# Patient Record
Sex: FEMALE | Race: BLACK OR AFRICAN AMERICAN | NOT HISPANIC OR LATINO | Employment: UNEMPLOYED | URBAN - METROPOLITAN AREA
[De-identification: names, ages, dates, MRNs, and addresses within clinical notes are randomized per-mention and may not be internally consistent; named-entity substitution may affect disease eponyms.]

---

## 2018-06-12 ENCOUNTER — HOSPITAL ENCOUNTER (EMERGENCY)
Facility: OTHER | Age: 48
Discharge: HOME | End: 2018-06-12
Attending: EMERGENCY MEDICINE

## 2018-06-12 VITALS
TEMPERATURE: 98 F | WEIGHT: 272 LBS | OXYGEN SATURATION: 99 % | BODY MASS INDEX: 42.69 KG/M2 | HEART RATE: 100 BPM | HEIGHT: 67 IN | DIASTOLIC BLOOD PRESSURE: 76 MMHG | SYSTOLIC BLOOD PRESSURE: 154 MMHG | RESPIRATION RATE: 16 BRPM

## 2018-06-12 DIAGNOSIS — T78.40XA ALLERGIC REACTION, INITIAL ENCOUNTER: Primary | ICD-10-CM

## 2018-06-12 LAB
B-HCG UR QL: NEGATIVE
CTP QC/QA: YES

## 2018-06-12 PROCEDURE — 25000003 PHARM REV CODE 250: Performed by: EMERGENCY MEDICINE

## 2018-06-12 PROCEDURE — 99283 EMERGENCY DEPT VISIT LOW MDM: CPT

## 2018-06-12 PROCEDURE — 81025 URINE PREGNANCY TEST: CPT | Performed by: EMERGENCY MEDICINE

## 2018-06-12 PROCEDURE — 63600175 PHARM REV CODE 636 W HCPCS: Performed by: EMERGENCY MEDICINE

## 2018-06-12 RX ORDER — PREDNISONE 20 MG/1
60 TABLET ORAL
Status: COMPLETED | OUTPATIENT
Start: 2018-06-12 | End: 2018-06-12

## 2018-06-12 RX ORDER — DIPHENHYDRAMINE HCL 25 MG
50 CAPSULE ORAL
Status: COMPLETED | OUTPATIENT
Start: 2018-06-12 | End: 2018-06-12

## 2018-06-12 RX ORDER — PREDNISONE 10 MG/1
10 TABLET ORAL DAILY
Qty: 21 TABLET | Refills: 0 | Status: SHIPPED | OUTPATIENT
Start: 2018-06-12 | End: 2018-06-22

## 2018-06-12 RX ADMIN — DIPHENHYDRAMINE HYDROCHLORIDE 50 MG: 25 CAPSULE ORAL at 04:06

## 2018-06-12 RX ADMIN — PREDNISONE 60 MG: 20 TABLET ORAL at 04:06

## 2018-06-12 NOTE — ED NOTES
Pt presents with c/o a rash with itching to BUE and BLE x3 days. Pt denies any use of new soaps recently. Pt reports she recently was taking Penicillin last week and tit was the first time she has ever taken the abx. Pt denies any chest tightness, sob or breathing difficulties. + hives with redness noted to BUE, BLE and on the inside of the pts lip. Pt is AAOx4. RR are even and unlabored. Pt is ambulatory with a steady gait.

## 2018-06-12 NOTE — ED PROVIDER NOTES
Encounter Date: 6/12/2018    SCRIBE #1 NOTE: I, Tamara Hubbard, am scribing for, and in the presence of, Dr. López.       History     Chief Complaint   Patient presents with    Allergic Reaction     hives to arms, legs, back, and face. x3 days. Last took benadryl at 12. Unknown allergies but recently bought new lotion. No oral swelling or difficulty breathing.     Time seen by provider: 4:15 PM    This is a 48 y.o. female who presents due to allergic reaction x 3 days. Pt notes itchy rash to the extremities. Rash started to the RUE and spread. Pt also has one sore to the inner lower lip. She took approximately 50mg of benadryl 4 hours ago. She does report having been on penicillin and motrin last week, though she stopped penicillin 2 days prior to onset of rash. It was the pt's first time taking this antibiotic. Pt is visit [place] from [place]. She denies any new lotions or perfumes. Pt is not on any prescription medications, no recent dose changes. She reports no known associated with similar rash. No pets in the house. She has no hx of DM. She denies any fever, chills, trouble swallowing, voice change, SOB, lightheadedness, weakness, and LOC.      The history is provided by the patient and a relative.     Review of patient's allergies indicates:  No Known Allergies  History reviewed. No pertinent past medical history.  History reviewed. No pertinent surgical history.  History reviewed. No pertinent family history.  Social History   Substance Use Topics    Smoking status: Never Smoker    Smokeless tobacco: Not on file    Alcohol use No     Review of Systems   Constitutional: Negative for chills and fever.   HENT: Negative for congestion, rhinorrhea, sore throat, trouble swallowing and voice change.    Respiratory: Negative for cough and shortness of breath.    Cardiovascular: Negative for chest pain.   Gastrointestinal: Negative for abdominal pain, diarrhea, nausea and vomiting.   Endocrine: Negative  for polyuria.   Genitourinary: Negative for decreased urine volume and dysuria.   Musculoskeletal: Negative for back pain.   Skin: Positive for rash. Negative for wound.   Allergic/Immunologic: Negative for immunocompromised state.   Neurological: Negative for dizziness, syncope, weakness, light-headedness, numbness and headaches.   Hematological: Does not bruise/bleed easily.   Psychiatric/Behavioral: Negative for confusion.       Physical Exam     Initial Vitals [06/12/18 1529]   BP Pulse Resp Temp SpO2   132/71 (!) 130 16 98 °F (36.7 °C) 97 %      MAP       --         Physical Exam    Nursing note and vitals reviewed.  Constitutional: She appears well-developed and well-nourished. She is not diaphoretic. She is Obese . No distress.   HENT:   Head: Normocephalic and atraumatic.   Right Ear: External ear normal.   Left Ear: External ear normal.   1.5cm area of circular edema to the L lower inner lip. No other oral or facial lesions.   Eyes: Right eye exhibits no discharge. Left eye exhibits no discharge.   Neck: Normal range of motion. Neck supple.   Cardiovascular: Normal rate, regular rhythm and normal heart sounds. Exam reveals no gallop and no friction rub.    No murmur heard.  Pulmonary/Chest: Breath sounds normal. No respiratory distress. She has no wheezes. She has no rhonchi. She has no rales.   Abdominal: Soft. Bowel sounds are normal. She exhibits no distension. There is no tenderness. There is no rebound and no guarding.   Musculoskeletal: Normal range of motion. She exhibits no edema or tenderness.   Lymphadenopathy:     She has no cervical adenopathy.   Neurological: She is alert and oriented to person, place, and time. She has normal strength. No sensory deficit.   Skin: Skin is warm and dry. Rash noted. No erythema.   Large, patchy hives of extremities, most prominent on the proximal lower legs. Spares face and minimally on torso.   Psychiatric: She has a normal mood and affect. Her behavior is  normal.         ED Course   Procedures  Labs Reviewed   POCT URINE PREGNANCY          No orders to display        Medical Decision Making:   Clinical Tests:   Lab Tests: Ordered and Reviewed            Scribe Attestation:   Scribe #1: I performed the above scribed service and the documentation accurately describes the services I performed. I attest to the accuracy of the note.    Attending Attestation:           Physician Attestation for Scribe:  Physician Attestation Statement for Scribe #1: I, Dr. López, reviewed documentation, as scribed by Tamara Hubbard in my presence, and it is both accurate and complete.           Patient presents with itchy hives for the past 3 days.  No trouble swallowing, breathing.  No prior similar episodes.  Currently visiting from out of town but no new medications, foods etc she had taken penicillin for the 1st time but finished it 2 or 3 days prior to onset of symptoms and therefore seems unlikely this would be the cause.  She has been taking Benadryl with incomplete relief will add prednisone.  Encouraged follow-up with primary care upon return home especially if has further symptoms does may need allergy testing             Clinical Impression:     1. Allergic reaction, initial encounter                                 Moody López II, MD  06/12/18 3193

## 2023-05-11 ENCOUNTER — DASHBOARD ENCOUNTERS (OUTPATIENT)
Age: 53
End: 2023-05-11

## 2023-05-11 ENCOUNTER — WEB ENCOUNTER (OUTPATIENT)
Dept: URBAN - METROPOLITAN AREA CLINIC 25 | Facility: CLINIC | Age: 53
End: 2023-05-11

## 2023-05-11 ENCOUNTER — OFFICE VISIT (OUTPATIENT)
Dept: URBAN - METROPOLITAN AREA CLINIC 25 | Facility: CLINIC | Age: 53
End: 2023-05-11

## 2023-05-11 VITALS
HEART RATE: 89 BPM | BODY MASS INDEX: 46.28 KG/M2 | HEIGHT: 66 IN | WEIGHT: 288 LBS | TEMPERATURE: 96.8 F | DIASTOLIC BLOOD PRESSURE: 88 MMHG | SYSTOLIC BLOOD PRESSURE: 143 MMHG

## 2023-05-11 DIAGNOSIS — K59.04 CHRONIC IDIOPATHIC CONSTIPATION: ICD-10-CM

## 2023-05-11 DIAGNOSIS — Z12.11 COLON CANCER SCREENING: ICD-10-CM

## 2023-05-11 PROBLEM — 82934008: Status: ACTIVE | Noted: 2023-05-11

## 2023-05-11 PROCEDURE — 99203 OFFICE O/P NEW LOW 30 MIN: CPT

## 2023-05-11 RX ORDER — AMLODIPINE BESYLATE 5 MG/1
1 TABLET TABLET ORAL ONCE A DAY
Status: ACTIVE | COMMUNITY

## 2023-05-11 RX ORDER — LINACLOTIDE 145 UG/1
1 CAPSULE AT LEAST 30 MINUTES BEFORE THE FIRST MEAL OF THE DAY ON AN EMPTY STOMACH CAPSULE, GELATIN COATED ORAL ONCE A DAY
Qty: 90 | Refills: 1 | OUTPATIENT
Start: 2023-05-11 | End: 2023-11-06

## 2023-05-11 RX ORDER — POLYETHYLENE GLYCOL 3350, SODIUM SULFATE ANHYDROUS, SODIUM BICARBONATE, SODIUM CHLORIDE, POTASSIUM CHLORIDE 236; 22.74; 6.74; 5.86; 2.97 G/4L; G/4L; G/4L; G/4L; G/4L
AS DIRECTED POWDER, FOR SOLUTION ORAL
Qty: 1 | Refills: 0 | OUTPATIENT
Start: 2023-05-11 | End: 2023-05-12

## 2023-05-11 RX ORDER — MELOXICAM 15 MG/1
1 TABLET TABLET ORAL ONCE A DAY
Status: ACTIVE | COMMUNITY

## 2023-05-11 NOTE — HPI-TODAY'S VISIT:
Ms. Colbert is a 52y F, she presents to the clinic for colon cancer screening via referral from [facility]  No change in bowel habits, patient admits to hard stools but denies straining, 2-3 BMs a week, she has tried mag citrate but nothing else   No change in weight No change in appetite No n/v No BRBPR, no Melena No abdominal pain No SOB, no CP Last colonoscopy: Never had one  No known family h/o colon cancer/polyps. Denies cardiac hardware, dialysis, blood thinner use, and or issues with anesthesia

## 2023-06-19 ENCOUNTER — OFFICE VISIT (OUTPATIENT)
Dept: URBAN - METROPOLITAN AREA SURGERY CENTER 20 | Facility: SURGERY CENTER | Age: 53
End: 2023-06-19

## 2023-06-19 ENCOUNTER — TELEPHONE ENCOUNTER (OUTPATIENT)
Dept: URBAN - METROPOLITAN AREA CLINIC 25 | Facility: CLINIC | Age: 53
End: 2023-06-19

## 2023-06-19 DIAGNOSIS — Z12.11 COLON CANCER SCREENING: ICD-10-CM

## 2023-06-19 PROCEDURE — 45378 DIAGNOSTIC COLONOSCOPY: CPT | Performed by: INTERNAL MEDICINE

## 2023-06-19 PROCEDURE — G8907 PT DOC NO EVENTS ON DISCHARG: HCPCS | Performed by: INTERNAL MEDICINE

## 2023-06-19 RX ORDER — AMLODIPINE BESYLATE 5 MG/1
1 TABLET TABLET ORAL ONCE A DAY
Status: ACTIVE | COMMUNITY

## 2023-06-19 RX ORDER — MELOXICAM 15 MG/1
1 TABLET TABLET ORAL ONCE A DAY
Status: ACTIVE | COMMUNITY

## 2023-06-19 RX ORDER — LINACLOTIDE 145 UG/1
1 CAPSULE AT LEAST 30 MINUTES BEFORE THE FIRST MEAL OF THE DAY ON AN EMPTY STOMACH CAPSULE, GELATIN COATED ORAL ONCE A DAY
Qty: 90 | Refills: 1 | Status: ACTIVE | COMMUNITY
Start: 2023-05-11 | End: 2023-11-06

## 2023-11-16 ENCOUNTER — OFFICE VISIT (OUTPATIENT)
Dept: URBAN - METROPOLITAN AREA CLINIC 25 | Facility: CLINIC | Age: 53
End: 2023-11-16